# Patient Record
Sex: MALE | Race: BLACK OR AFRICAN AMERICAN | NOT HISPANIC OR LATINO | Employment: UNEMPLOYED | URBAN - METROPOLITAN AREA
[De-identification: names, ages, dates, MRNs, and addresses within clinical notes are randomized per-mention and may not be internally consistent; named-entity substitution may affect disease eponyms.]

---

## 2020-10-08 PROBLEM — Z00.00 HEALTH CARE MAINTENANCE: Status: ACTIVE | Noted: 2020-10-08

## 2020-10-08 PROBLEM — Z87.898 HISTORY OF SNORING: Status: ACTIVE | Noted: 2020-10-08

## 2020-10-08 PROBLEM — I10 HYPERTENSION: Status: ACTIVE | Noted: 2020-10-08

## 2020-10-22 PROBLEM — E66.9 OBESITY: Status: ACTIVE | Noted: 2020-10-22

## 2020-10-22 PROBLEM — G47.30 SLEEP APNEA: Status: ACTIVE | Noted: 2020-10-22

## 2020-10-22 PROBLEM — N52.9 ERECTILE DYSFUNCTION: Status: ACTIVE | Noted: 2020-10-22

## 2020-11-24 PROBLEM — E11.9 TYPE 2 DIABETES MELLITUS WITHOUT COMPLICATION, WITHOUT LONG-TERM CURRENT USE OF INSULIN: Status: ACTIVE | Noted: 2020-11-24

## 2021-01-11 PROBLEM — Z00.00 HEALTH CARE MAINTENANCE: Status: RESOLVED | Noted: 2020-10-08 | Resolved: 2021-01-11

## 2021-03-10 PROBLEM — G47.33 OBSTRUCTIVE SLEEP APNEA SYNDROME: Status: ACTIVE | Noted: 2020-10-22

## 2022-02-03 PROBLEM — N50.819 TESTICLE PAIN: Status: ACTIVE | Noted: 2022-02-03

## 2023-12-12 ENCOUNTER — PATIENT OUTREACH (OUTPATIENT)
Dept: ADMINISTRATIVE | Facility: HOSPITAL | Age: 53
End: 2023-12-12
Payer: MEDICAID

## 2024-11-19 ENCOUNTER — PATIENT MESSAGE (OUTPATIENT)
Dept: ADMINISTRATIVE | Facility: HOSPITAL | Age: 54
End: 2024-11-19
Payer: MEDICAID

## 2025-05-28 ENCOUNTER — PATIENT MESSAGE (OUTPATIENT)
Dept: ADMINISTRATIVE | Facility: HOSPITAL | Age: 55
End: 2025-05-28
Payer: MEDICAID

## 2025-07-19 ENCOUNTER — PATIENT MESSAGE (OUTPATIENT)
Dept: URGENT CARE | Facility: CLINIC | Age: 55
End: 2025-07-19

## 2025-08-06 ENCOUNTER — PATIENT OUTREACH (OUTPATIENT)
Dept: ADMINISTRATIVE | Facility: HOSPITAL | Age: 55
End: 2025-08-06
Payer: MEDICAID

## 2025-08-06 DIAGNOSIS — E11.9 TYPE 2 DIABETES MELLITUS WITHOUT COMPLICATION, WITHOUT LONG-TERM CURRENT USE OF INSULIN: Primary | ICD-10-CM

## 2025-08-06 NOTE — PROGRESS NOTES
8-6-25- please address open care gap DM eye exam and collect A1c noted on 8-7-25 appt notes. . If screening completed at outside facility, please get sign CARISSA, to request records.

## 2025-08-07 PROBLEM — G62.9 NEUROPATHY: Status: ACTIVE | Noted: 2025-08-07

## 2025-08-07 PROBLEM — K52.9 CHRONIC DIARRHEA: Status: ACTIVE | Noted: 2025-08-07

## 2025-08-07 PROBLEM — E78.5 HYPERLIPIDEMIA: Status: ACTIVE | Noted: 2025-08-07

## 2025-08-07 PROBLEM — M19.90 ARTHRITIS: Status: ACTIVE | Noted: 2025-08-07
